# Patient Record
Sex: MALE | Race: WHITE | NOT HISPANIC OR LATINO | Employment: STUDENT | URBAN - METROPOLITAN AREA
[De-identification: names, ages, dates, MRNs, and addresses within clinical notes are randomized per-mention and may not be internally consistent; named-entity substitution may affect disease eponyms.]

---
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
## 2019-01-03 ENCOUNTER — OFFICE VISIT (OUTPATIENT)
Dept: PSYCHIATRY | Facility: CLINIC | Age: 19
End: 2019-01-03
Payer: COMMERCIAL

## 2019-01-03 DIAGNOSIS — F20.9 SCHIZOPHRENIA, UNSPECIFIED TYPE (H): Primary | ICD-10-CM

## 2019-01-03 NOTE — PROGRESS NOTES
ARASELI Clinician Contact & Progress Note  For Individual Resiliency Training (IRT)  A Part of the [facility] First Episode of Psychosis Program    NAVIGATE Enrollee: Johnny Moraes (2000)     MRN: [id number]  Date:  12/18/18  Diagnosis: Schizophrenia, unspecified (F20.9)  Clinician: ARASELI Individual Resiliency Trainer, RAINA Blanchard     1. Type of contact: (majority of time spent)  IRT Session    2. People present:   Writer  Client: Johnny Moraes    3. Total number of persons who participated in contact: 2, including writer    4. Length of Actual Contact: Start Time: 2:00; End Time: 3:00   Traveled?    No     5. Location of contact:  Psychiatry Clinic, [place]    6. Did the client complete the home practice option(s) from the previous session: Partially Completed    7. Motivational Teaching Strategies:  Connect info and skills with personal goals  Promote hope and positive expectations    8. Educational Teaching Strategies:  Relate information to client's experience  Ask questions to check comprehension  Break down information into small chunks    9. CBT Teaching Strategies:  Reinforcement and shaping (positive feedback for steps towards goals)  Relapse prevention planning (review of stressors)    10. IRT Module(s) Addressed:  Module 4 - Relapse Prevention Planning  Module 8 - Dealing with Negative Feelings  Module 9 - Coping with Symptoms    11. Techniques utilized:   [place] announced at beginning of session  Review of goal  Review of previous meeting  Present new material  Problem-solving practice  Help client choose a home practice option  Summarize progress made in current session    12. Mental Status Exam:    Alertness: alert  and oriented  Appearance: casually groomed  Behavior/Demeanor: cooperative and pleasant, with good  eye contact   Speech: normal and regular rate and rhythm  Language: intact. Preferred language identified as English.  Psychomotor: fidgety  Mood: depressed  Affect: appropriate;  "was congruent to mood; was congruent to content  Thought Process/Associations: perseverative, disorganized, loose associations, thought blocking and flight of ideas  Thought Content:  Reports delusions and preoccupations;  Denies suicidal and violent ideation  Perception:  Reports none;  Denies auditory hallucinations and visual hallucinations  Insight: fair  Judgment: fair  Cognition: does  appear grossly intact; formal cognitive testing was not done  Suicidal ideation: denies SI, denies intent,  and denies plan  Homicidal Ideation: denies    13. Assessment/Progress Note:     This writer met with Johnny for a follow-up IRT session. Throughout the session, Johnny appeared to have loose associations, thought blocking, and disorganized thoughts. He denied any auditory hallucinations and stated he has been \"thinking more than usual\" about news stories and the recommendations offered to him on Youtube. However, he stated this lasts a few minutes and then he is able to continue with his day. Johnny reported regular medication adherence. Johnny mentioned he feels as though he understands his thoughts/feelings more since being in the [facility]IG[facility] Program. Johnny would like to continue in therapy, as he feels he has an ongoing outlet for stressors. Johnny would like to gain more independence and move out within the next year. He is participating in a class through [employer]. He also plans to engage with a  later today and maintain this exercise routine. This writer praised and provided reinforcement to Johnny for taking steps towards his wellness goals. Johnny asked this writer about the benefits and research behind CBD oil. This writer discussed the lack of research into outcomes at this time. Johnny discussed the upcoming holiday season and the anticipated stressors. He recalled being \"overly anxious\" at prior family gatherings. However, he now feels more confident in his communication abilities. This writer encouraged " "Johnny to notice if any stress arises throughout the upcoming weeks. He mentioned wanting to talk to his family, go for a walk, take a deep breath if he is triggered.     14. Plan/Referrals:     This writer will continue to assist Johnny in noticing stressors and preventing relapses.     Billing for \"Interactive Complexity\"?    No      RAINA Blanchard    NAVIGATE Individual Resiliency Trainer    Attestation:    I did not see this patient directly. This patient is discussed with me in individual clinical social work supervision, and I agree with the plan as documented.     DELMER Gonzalez, LICSW, January 7, 2019        "

## 2019-01-04 ENCOUNTER — ALLIED HEALTH/NURSE VISIT (OUTPATIENT)
Dept: PSYCHIATRY | Facility: CLINIC | Age: 19
End: 2019-01-04
Payer: COMMERCIAL

## 2019-01-04 ENCOUNTER — OFFICE VISIT (OUTPATIENT)
Dept: PSYCHIATRY | Facility: CLINIC | Age: 19
End: 2019-01-04
Payer: COMMERCIAL

## 2019-01-04 ENCOUNTER — BEH TREATMENT PLAN (OUTPATIENT)
Dept: PSYCHIATRY | Facility: CLINIC | Age: 19
End: 2019-01-04

## 2019-01-04 VITALS — SYSTOLIC BLOOD PRESSURE: 142 MMHG | HEART RATE: 55 BPM | DIASTOLIC BLOOD PRESSURE: 66 MMHG

## 2019-01-04 DIAGNOSIS — F20.9 SCHIZOPHRENIA, UNSPECIFIED TYPE (H): Primary | ICD-10-CM

## 2019-01-04 NOTE — PROGRESS NOTES
NAVIGANA Clinician Contact & Progress Note  For Individual Resiliency Training (IRT)  A Part of the [facility] First Episode of Psychosis Program    NAVIGATE Enrollee: Johnny Moraes (2000)     MRN: [id number]  Date:  1/04/19  Diagnosis: Schizophrenia, unspecified type (F20.9)  Clinician: ARASELI Individual Resiliency Trainer, RAINA Blanchard     1. Type of contact: (majority of time spent)  IRT Session    2. People present:   Writer  Client: Johnny Moraes    3. Total number of persons who participated in contact: 2, including writer    4. Length of Actual Contact: Start Time: 11:15; End Time: 12:00   Traveled?    No     5. Location of contact:  Psychiatry Clinic, [place]    6. Did the client complete the home practice option(s) from the previous session: Completed    7. Motivational Teaching Strategies:  Connect info and skills with personal goals  Promote hope and positive expectations  Re-frame experiences in positive light    8. Educational Teaching Strategies:  Review of written material/education  Relate information to client's experience  Ask questions to check comprehension    9. CBT Teaching Strategies:  Reinforcement and shaping (positive feedback for steps towards goals and gains in knowledge & skills)  Cognitive restructuring (identify thoughts related to negative feelings and examine the evidence)    10. IRT Module(s) Addressed:  Module 5 - Processing the Psychotic Episode  Module 8 - Dealing with Negative Feelings    11. Techniques utilized:   [place] announced at beginning of session  Review of goal  Review of previous meeting  Present new material  Problem-solving practice  Help client choose a home practice option  Summarize progress made in current session    12. Mental Status Exam:    Alertness: alert  and oriented  Appearance: casually groomed  Behavior/Demeanor: cooperative and pleasant, with good  eye contact   Speech: increased latency of response  Language: intact. Preferred language  "identified as English.  Psychomotor: fidgety  Mood: depressed and worried  Affect: appropriate; was congruent to mood; was congruent to content  Thought Process/Associations: perseverative and response delay  Thought Content:  Reports suicidal ideation and preoccupations;  Denies violent ideation  Perception:  Reports none;  Denies auditory hallucinations and visual hallucinations  Insight: adequate  Judgment: adequate for safety  Cognition: does  appear grossly intact; formal cognitive testing was not done  Suicidal ideation: reports SI, denies intent,  and denies plan  Homicidal Ideation: denies    13. Assessment/Progress Note:     This writer met with Johnny following his injection. He was convinced his injection is a placebo or contains very small doses of medication. He denied feeling as though the medication isn't working, but said he is 3 days late in receiving it and didn't notice any symptoms. This writer provided reasoning as to why the medication would not be a placebo. He then said, \"Yes, you can't really put just water into your veins.\" He also noted his arm hurts a bit after, so \"something is happening.\" Johnny reported passive suicidal ideation while driving into the clinic today. He observed how high the bridge was and thought of hurting himself for 2 seconds. Johnny noted he typically does not travel over bridges, but did not have a plan or intent. He stated, \"I don't want to die at all.\" He had future orientation regarding the appointment and New Year's resolutions. This writer and Johnny updated his treatment plan goals. These currently include: build up physical exercise endurance, complete class at [employer], finish 12 weeks with , go on a ski trip with brother, improve nutrition (protein shakes), go on vacation with family in fall, meet friends at [employer]. Johnny denied any current positive symptoms of psychosis. He stated he had delusional thoughts about a month ago, but " "denied within the past 2 weeks. Johnny denied any negative self-talk or anxious thoughts. Johnny expressed interest in continuing to process his episode. This writer and Johnny began processing, starting with his sophomore year of high school and prodrome symptoms. 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     14. Plan/Referrals:     This writer will continue to assist Johnny in processing his episode, using a narrative timeline.     Billing for \"Interactive Complexity\"?    No      RAINA Blanchard    NAVIGANA Individual Resiliency Trainer    Attestation:    I did not see this patient directly. This patient is discussed with me in individual clinical social work supervision, and I agree with the plan as documented.     DELMER Gonzalez, [facility]. [facility] Hospital[facility], January 31, 2019      "

## 2019-01-07 ASSESSMENT — ANXIETY QUESTIONNAIRES
1. FEELING NERVOUS, ANXIOUS, OR ON EDGE: NOT AT ALL
2. NOT BEING ABLE TO STOP OR CONTROL WORRYING: SEVERAL DAYS
3. WORRYING TOO MUCH ABOUT DIFFERENT THINGS: NOT AT ALL
5. BEING SO RESTLESS THAT IT IS HARD TO SIT STILL: NOT AT ALL
6. BECOMING EASILY ANNOYED OR IRRITABLE: SEVERAL DAYS
7. FEELING AFRAID AS IF SOMETHING AWFUL MIGHT HAPPEN: SEVERAL DAYS
GAD7 TOTAL SCORE: 3

## 2019-01-07 ASSESSMENT — PATIENT HEALTH QUESTIONNAIRE - PHQ9
5. POOR APPETITE OR OVEREATING: NOT AT ALL
SUM OF ALL RESPONSES TO PHQ QUESTIONS 1-9: 4

## 2019-01-08 NOTE — PROGRESS NOTES
NAVIGATE SEE Progress Note   For Supported Employment & Education    NAVIGATE Enrollee: Johnny Moraes (2000)     MRN: [id number]  Date:  1/3/19  Clinician: [facility] Supported Employment & , Anais Quiroz    1. Client Status Update:   Johnny Moraes is interested in education (Client enrolled in class or school (e.g. GED course, certificate program, [employer] or other educational programs)) and employment (Client developed employement goals)    2. People present:   SEE/Writer  Client: Johnny Moraes    3. Total number of persons who participated in contact: (do not count yourself/SEE) 1    4. Length of Actual Contact: 60 minutes   Traveled? Yes  Total Travel Time: 45 minutes    5. Location of contact:  Client's Home    6. Brief description of session, contact, or client status (include: strategies, interventions, client reaction to contact, next steps, etc)    Writer and Johnny met at his home for a follow up SEE session. Johnny reports he has been doing well. He had a low key holiday and signed up for a  at [employer]. Johnny also signed up for a hybrid online/in person course at [employer] that starts 1/14/19. Johnny would like to start working in Feb/March after he has been in class for a few weeks.   Johnny needed to complete an online orientation to [employer] so we sat and read outloud each section on [employer], Student Services, Preparing for College, Tuition and Payment as well as checking grades, how to sign up for the Access Center and peer tutoring. Johnny felt he had adequate information on how to start school. This writer and Johnny will work on Academic and study skills as well as working on exploring jobs near his home.     7. Completion of mutually agreed upon client task from previous meeting:  Completed    8. Orientation and Treatment Planning:  Pursuing current SEE goals    9. Assessment:  Assessing client's need for follow-along supports    10.  Placement:  Education (Assisting client with completing forms or applications)    11. Follow Along Supports: (for clients who are working or attending school)   Education (Assisting with requesting accommodations)    12. Mutually agreed upon client task for next meeting:     Call access center    13. Next Meeting Scheduled for: 1/17 and phone check in the 14th before class    Anais [facility]ILL[employer] Supported Employment &

## 2019-01-09 ASSESSMENT — ANXIETY QUESTIONNAIRES: GAD7 TOTAL SCORE: 3

## 2019-01-17 ENCOUNTER — OFFICE VISIT (OUTPATIENT)
Dept: PSYCHIATRY | Facility: CLINIC | Age: 19
End: 2019-01-17
Payer: COMMERCIAL

## 2019-01-17 DIAGNOSIS — F20.9 SCHIZOPHRENIA, UNSPECIFIED TYPE (H): Primary | ICD-10-CM

## 2019-01-17 NOTE — PROGRESS NOTES
NAVIGATE SEE Progress Note   For Supported Employment & Education    NAVIGATE Enrollee: Johnny Moraes (2000)     MRN: [id number]  Date:  1.17.19  Clinician: [facility] Supported Employment & , Anais Quiroz    1. Client Status Update:   Johnny Moraes is interested in education (Client enrolled in class or school (e.g. GED course, certificate program, [employer] college or other educational programs))    2. People present:   SEE/Writer  Client: Johnny Moraes      3. Total number of persons who participated in contact: (do not count yourself/SEE) 1    4. Length of Actual Contact: 40 minutes   Traveled? Yes  Total Travel Time: 40 minutes    5. Location of contact:  Client's Home    6. Brief description of session, contact, or client status (include: strategies, interventions, client reaction to contact, next steps, etc)    Johnny and this writer met at his home for a follow up see session. Johnny reports things are going well and has been meeting with a  and working out 4x a week. 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 Johnny and this writer reviewed his syllabi and put due dates of quizzes and exams in his calendar. Johnny feels confident that he will do well this semester with the help of his mom and step dad. Johnny and this writer discussed having only check ins as needed with the intention of phasing out of navigate.    7. Completion of mutually agreed upon client task from previous meeting:  Completed    8. Orientation and Treatment Planning:  Pursuing current SEE goals    9. Assessment:  Assessing client's need for follow-along supports    10. Placement:  Not Applicable    11. Follow Along Supports: (for clients who are working or attending school)   Education (Assisting with development and use of natural support for school)    12. Mutually agreed upon client task for next meeting:      Add all dates in calendar for school, finish assignments    13. Next Meeting Scheduled for: mame [employer] Supported Employment &

## 2019-01-17 NOTE — Clinical Note
KYLER Turner seemed to be doing really well. He was able to communicate clearer than ever and says he is feeling confident that this semester will be fine just doing check ins with me over the phone. He'd like to start phasing out of Navigate.

## 2019-01-18 ENCOUNTER — ALLIED HEALTH/NURSE VISIT (OUTPATIENT)
Dept: PSYCHIATRY | Facility: CLINIC | Age: 19
End: 2019-01-18
Payer: COMMERCIAL

## 2019-01-18 ENCOUNTER — OFFICE VISIT (OUTPATIENT)
Dept: PSYCHIATRY | Facility: CLINIC | Age: 19
End: 2019-01-18
Payer: COMMERCIAL

## 2019-01-18 VITALS — SYSTOLIC BLOOD PRESSURE: 131 MMHG | HEART RATE: 60 BPM | DIASTOLIC BLOOD PRESSURE: 40 MMHG

## 2019-01-18 DIAGNOSIS — F20.9 SCHIZOPHRENIA, UNSPECIFIED TYPE (H): Primary | ICD-10-CM

## 2019-01-18 NOTE — NURSING NOTE
Johnny Moraes comes into clinic today at the request of ELMO Ram, PMHNP-BC Ordering Provider for Med Injection only Risperdal Consta.    Medication dbouel checked by: Jet Posey RNCC  Prior to administration pt identified by name and : yes    Appearance: dressed casually    Mood: good  Affect: flat   Eye contact: fair   Side effects: denies   Level of cooperation: cooperative  Education: none needed   Next appt: two weeks         This service provided today was under the supervising provider of the day Danyell Junior MD, who was available if needed.    LAILA COLLAZO

## 2019-01-21 ASSESSMENT — ANXIETY QUESTIONNAIRES
5. BEING SO RESTLESS THAT IT IS HARD TO SIT STILL: SEVERAL DAYS
1. FEELING NERVOUS, ANXIOUS, OR ON EDGE: NOT AT ALL
6. BECOMING EASILY ANNOYED OR IRRITABLE: NOT AT ALL
3. WORRYING TOO MUCH ABOUT DIFFERENT THINGS: NOT AT ALL
2. NOT BEING ABLE TO STOP OR CONTROL WORRYING: NOT AT ALL
7. FEELING AFRAID AS IF SOMETHING AWFUL MIGHT HAPPEN: NOT AT ALL
GAD7 TOTAL SCORE: 1

## 2019-01-21 ASSESSMENT — PATIENT HEALTH QUESTIONNAIRE - PHQ9
5. POOR APPETITE OR OVEREATING: NOT AT ALL
SUM OF ALL RESPONSES TO PHQ QUESTIONS 1-9: 1

## 2019-01-21 NOTE — PROGRESS NOTES
ARASELI Clinician Contact & Progress Note  For Individual Resiliency Training (IRT)  A Part of the [facility] First Episode of Psychosis Program    NAVIGATE Enrollee: Johnny Moraes (2000)     MRN: [id number]  Date:  1/18/19  Diagnosis: Schizophrenia, unspecified (F20.9)  Clinician: ARASELI Individual Resiliency Trainer, RAINA Blanchard     1. Type of contact: (majority of time spent)  IRT Session    2. People present:   Writer  Client: Johnny Moraes    3. Total number of persons who participated in contact: 2, including writer    4. Length of Actual Contact: Start Time: 9:00; End Time: 10:00   Traveled?    No     5. Location of contact:  Psychiatry Clinic, [place]    6. Did the client complete the home practice option(s) from the previous session: Completed    7. Motivational Teaching Strategies:  Connect info and skills with personal goals  Promote hope and positive expectations  Explore pros and cons of change    8. Educational Teaching Strategies:  Review of written material/education  Relate information to client's experience  Ask questions to check comprehension  Break down information into small chunks    9. CBT Teaching Strategies:  Reinforcement and shaping (positive feedback for steps towards goals and gains in knowledge & skills)    10. IRT Module(s) Addressed:  Module 3 - Education about Psychosis  Module 7 - Building a Bridging to Your Goals    11. Techniques utilized:   [place] announced at beginning of session  Review of homework  Review of goal  Problem-solving practice  Help client choose a home practice option  Summarize progress made in current session    12. Mental Status Exam:    Alertness: oriented, drowsy and sleepy  Appearance: casually groomed  Behavior/Demeanor: cooperative, pleasant and calm, with good  eye contact   Speech: normal and regular rate and rhythm  Language: intact. Preferred language identified as English.  Psychomotor: normal or unremarkable  Mood: depressed  Affect:  "appropriate; was congruent to mood; was congruent to content  Thought Process/Associations: unremarkable  Thought Content:  Reports none;  Denies suicidal and violent ideation and delusions  Perception:  Reports none;  Denies auditory hallucinations and visual hallucinations  Insight: good  Judgment: good  Cognition: does  appear grossly intact; formal cognitive testing was not done  Suicidal ideation: denies SI, denies intent,  and denies plan  Homicidal Ideation: denies    13. Assessment/Progress Note:     This writer met with Johnny for a follow-up IRT session. He noted he has not had any positive psychosis symptoms within the past 2 weeks. He has been regularly exercising and has obtained a . He expressed difficulties with falling asleep throughout the week. This writer discussed sleep hygiene tips, including reducing screen time before bed and a routine sleep schedule. Johnny expressed understanding and agreed to try this. He has started college courses, with 6 total credits. He feels this is a manageable caseload and denied an increase in stress. He expressed pride when discussing introducing himself on the first day of class. Johnny began processing through the progress he's made within the past 2 years, since joining [employer]. He said he's gained independence, decreased anxiety about others' thoughts, completed exercise goals, began school again, and is reaching out to others when help is needed. This writer praised Johnny for all of his continued success. Johnny would like to have a family treatment planning meeting to further discuss discharge. He would like to transition to community care for medications, but feels as though he is able to maintain wellness independently otherwise.      14. Plan/Referrals:     This writer will have scheduling call to set up a family and NAVIGATE team discharge meeting.    This writer will update the team.    Billing for \"Interactive Complexity\"?    No      Mary " RAINA Johnson    [facility] Individual Resiliency Trainer    Attestation:    I did not see this patient directly. This patient is discussed with me in individual clinical social work supervision, and I agree with the plan as documented.     DELMER Gonzalez, LICSW, February 11, 2019

## 2019-01-22 ASSESSMENT — ANXIETY QUESTIONNAIRES: GAD7 TOTAL SCORE: 1

## 2019-01-31 ENCOUNTER — TELEPHONE (OUTPATIENT)
Dept: PSYCHIATRY | Facility: CLINIC | Age: 19
End: 2019-01-31

## 2019-01-31 NOTE — TELEPHONE ENCOUNTER
-Writer called patient to discuss injection, as injection copayment is over $900.  Received voicemail.  Left message asking for a return call.

## 2019-02-01 NOTE — TELEPHONE ENCOUNTER
-Writer also attempted to call patient's mother and received voicemail.  Left message asking for a return call regarding appointment and copay for injection.

## 2019-02-01 NOTE — TELEPHONE ENCOUNTER
-Was able to speak with Yeimi, she is going to call and arrange payment for injection.  Appointment rescheduled for 2/4/19.

## 2019-02-04 ENCOUNTER — ALLIED HEALTH/NURSE VISIT (OUTPATIENT)
Dept: PSYCHIATRY | Facility: CLINIC | Age: 19
End: 2019-02-04
Payer: COMMERCIAL

## 2019-02-04 VITALS — DIASTOLIC BLOOD PRESSURE: 55 MMHG | SYSTOLIC BLOOD PRESSURE: 121 MMHG | HEART RATE: 64 BPM

## 2019-02-04 DIAGNOSIS — F20.9 SCHIZOPHRENIA, UNSPECIFIED TYPE (H): Primary | ICD-10-CM

## 2019-02-04 NOTE — NURSING NOTE
Johnny Moraes comes into clinic today at the request of ELMO Ram, PMP-BC Ordering Provider for Med Injection only Risperdal Consta.    Medication double checked by: Cathi Tse RNCC  Prior to administration pt identified by name and : yes     Appearance: dressed casually    Mood: good  Affect: flat  Eye contact: good  Side effects: denies   Level of cooperation: cooperative  Education: none needed   Next appt:  2 weeks       Was there drug waste? No   Multi-dose vial: No      This service provided today was under the supervising provider of the day Danyell Junior MD, who was available if needed.    LAILA COLLAZO

## 2019-02-05 NOTE — PROGRESS NOTES
[facility]IG[facility] Program Treatment Plan Summary  A Part of the [facility] First Episode of Psychosis Program     NAVIGATE Enrollee: Johnny Moraes  /Age:  2000 (19 year old)  MRN: [id number]    Date of Initial Service: 3/13/17  Date of INTIAL Treatment Plan: 17  Last Review/Update Date:  10/19/18  90-Day Review Date:  19    The following represents REVIEWED treatment plan.    1. DSM-V Diagnosis (include numeric code)  Schizophrenia, 295.90 (F20.9)    2. Current symptoms and circumstances that substantiate the diagnosis    Johnny has a history of psychosis (delusions, visual hallucinations, disorganized speech and negative symptoms (diminished emotional expression, anhedonia and apathy)), depression (low mood nearly every day, anhedonia most of the time, appetite change (decrease), weight change (decrease), difficulties with sleep, low energy and suicidal ideation with plan, with intent), SI and low self-esteem. He presents with current symptoms of psychosis (delusions).     3. How symptoms and/or behaviors are affecting level of function    Johnny s systems are impacting functioning with respect to social relationships, employment and academics.    4. Risk Assessment:  Suicide:  Assessed Level of Immediate Risk: Low  Ideation: No  Plan:  No  Means: No  Intent: No    Homicide/Violence:  Assessed Level of Immediate Risk: None  Ideation: No  Plan: No  Means: No  Intent: No    5. Medications    Current Outpatient Medications   Medication     FLUoxetine (PROZAC) 40 MG capsule     hydrOXYzine (VISTARIL) 25 MG capsule     loratadine (CLARITIN) 10 MG tablet     multivitamin, therapeutic with minerals (MULTI-VITAMIN) TABS tablet     risperiDONE (RISPERDAL) 2 MG tablet     risperiDONE microspheres (RISPERDAL CONSTA) 25 MG injection     Current Facility-Administered Medications   Medication     risperiDONE microspheres (risperDAL CONSTA) injection 25 mg       6. Strengths     Medication adherence  Supportive friends,  family, recovery environment  Capacity to love and be loved    Curiosity    Gratitude & Thankfulness  Honest, Authentic, Genuine    Humor & Playfulness   Industry, diligence, & perseverance    Judgment, critical thinking, & open-mindedness    Kind & Generous    Modesty & Humility    Self-control & Self-regulation    Teamwork & Loyalty      7. Barriers & Suicide Risk Factors     Depression and/or Hopelessness  Male    8. Treatment Domains and Goals    Domain 1: Illness Management & Recovery  Identify and engage possible areas of improvement related to medication optimization, psychosis (delusions and negative symptoms (diminished emotional expression and anhedonia)) and depression (difficulties with sleep and low energy) and ability to management illness.     Measurable Objectives Interventions Target Dates & Discharge Criteria   Medication Objectives    -Support system assists with overcoming barriers to taking medications   Medication Management  -Prescribe and monitor medications  -Monitor and treat side effects  -Psychoeducation  -Behavioral activation  -Initial and routine lab work    IRT/Psychotherapy  -Psychoeducation  -Motivation interviewing  -CBT  -Behavioral activation  -Mindfulness  -Family involvement during portions of sessions    Family Therapy  -Psychoeducation  -Motivational interviewing  -Behavioral family therapy  -CBT  -Behavioral activation    Case Management  -NA or None   Target date:   3 months from 1/04/19    Discharge criteria:  Marked and sustained symptom improvement     Gains Made:  -Paricipate in a medication evaluation   -Take medications as prescribed and have reduced frequency and severity of symptoms  -Learn and implement strategies for overcoming barriers to taking medication  -Support system assists with overcoming barriers to taking medications     Individual s Objectives    -Learn strategies to build positive emotions and facilitate resiliency   -Identify primary styles of  thinking, and demonstrate understanding of and use cognitive restructuring to successfully deal with negative feelings  -Identify persistent symptoms that interfere with activities and/or enjoyment and successfully implement two coping strategies to reduce symptoms severity   Medication Management  -Prescribe and monitor medications  -Monitor and treat side effects  -Psychoeducation    IRT/Psychotherapy  -Psychoeducation  -Motivation interviewing  -CBT  -Behavioral activation  -Mindfulness    Family Therapy  -Psychoeducation  -Motivational interviewing  -Behavioral family therapy  -CBT  -Behavioral activation    Case Management  -NA or None   Target date:   3 months from 1/04/19    Discharge criteria:  Marked and sustained symptom improvement     Johnny demonstrates understanding of mental illness     Johnny successfully implements strategies to cope with stressors and/or symptoms to mitigate risk for increase in symptom severity or relapse    Gains Made:  -Complete a safety plan with therapist and share with support system  -Define what recovery means to self  -Identify psychosocial areas of need  -Identify top 5 strengths and use those strengths when working toward goal achievement; simultaneously choose one area for improvement and identify two actionable steps toward improvement  -Create a goal plan consisting of one long-term goal, three short-term goals, and actionable steps toward short-term goal achievement  -Demonstrate understanding of psychosis (delusions) in the context of self with respect to symptoms, causes, course, medications and the impact of stress  -Learn at least 2 coping strategies to successfully target current symptoms  -Demonstrate understanding for how substance use impacts symptoms, identify stage of change, and experiment with reduced use or abstinence from all illicit substances   -Develop and implement a relapse prevention plan including identification of warning signs, triggers, coping  mechanisms, and how other persons can be supportive if symptoms increase or reemerge   -Process the psychotic episode by demonstrating understanding of how the episode impacted self, identifying positive coping strategies and resiliency used during that time, challenging self-stigmatizing beliefs, and developing a positive attitude towards facing future life challenges  -Identify primary styles of thinking, and demonstrate understanding of and use cognitive restructuring to successfully deal with negative feelings  -Identify persistent symptoms that interfere with activities and/or enjoyment and successfully implement two coping strategies to reduce symptoms severity     Support System Objectives    -Supports agree to provide supervision and monitor suicidal potential   -Identify psychosocial areas of need   Medication Management  -Prescribe and monitor medications  -Monitor and treat side effects  -Psychoeducation    IRT/Psychotherapy  -Psychoeducation  -Motivation interviewing  -CBT  -Behavioral activation    Family Therapy  -Psychoeducation  -Motivational interviewing  -Behavioral family therapy  -CBT  -Behavioral activation    Case Management  -NA or None   Target date:   3 months from 1/04/19    Discharge criteria:  Support system demonstrates understanding of mental illness     Support system successfully implements strategies to assist Johnny cope with stressors and/or symptoms to mitigate risk for increase in symptom severity or relapse     Gains Made:  -Supports agree to provide supervision and monitor suicidal potential   -Supports, including family members, verbally reinforce the client's active attempts to build self-esteem and rapport   -Supports verbalize increased understanding of an knowledge about the client's illness and treatment   -Identify psychosocial areas of need  -Verbalize understanding of the client's long-term and short-term goals  -Demonstrate understanding of psychosis (delusions) in the  context of the client with respect to symptoms, causes, course, medications and the impact of stress  -Learn the client's signs of stress and possible coping skills  -Learn skills that strengthen and support the client's positive behavior change  -Develop and implement a relapse prevention plan including identification of warning signs, triggers, coping mechanisms, and how other persons can be supportive if symptoms increase or reemerge   -Learn and implement communication skills to enhance communication and respect among family members  -Learn and implement problem-solving and/or conflict resolution skills to manage familial, personal and interpersonal problems constructively       Domain 2: Health & Basic Living Needs  Identify and engage possible areas of improvement related to basic needs being met and maintaining or improving overall health and well-being     Measurable Objectives Interventions Discharge Criteria   -Learn and implement at least 2 skills to promote health sleep  -Establish and adhere to a plan to increase physical exercise   Medication Management  -Prescribe and monitor medications  -Monitor and treat side effects  -Psychoeducation    IRT/Psychotherapy  -Psychoeducation  -Motivation interviewing  -CBT  -Behavioral activation    Family Therapy  -Psychoeducation  -Motivational interviewing  -Behavioral family therapy  -CBT  -Behavioral activation    Supported Education & Employment  -Motivational interviewing  -Individualized placement and support     Case Management  -NA or None   Target date:   3 months from 1/04/19    Discharge criteria:  Johnny, his supports and treatment team report no unmet health and basic living needs    Gains Made:  -Learn and implement at least healthy nutritional practices  -Learn and implement at least 2 skills to promote health sleep  -Establish and adhere to a plan to increase physical exercise  -Independently arrange transportation to/from appointments   -Identify  areas of improvement for ADLs and IADLs and implement at least two skills with improved outcomes       Domain 3: Family & Other Supports  Identify and engage possible areas of improvement related to engaging family, friends and other supports     Measurable Objectives Interventions Discharge Criteria   -Participate in group therapy   -Improve the quality of relationships by developing skills to better understand other people, communicate more effectively, manage disclosure, and understand social cues   Medication Management  -Prescribe and monitor medications  -Monitor and treat side effects  -Psychoeducation  -Behavioral activation    IRT/Psychotherapy  -Psychoeducation  -Motivation interviewing  -CBT  -Behavioral activation    Family Therapy  -Psychoeducation  -Motivational interviewing  -Behavioral family therapy  -CBT  -Behavioral activation    Supported Education & Employment  -Motivational interviewing  -Individualized placement and support   -Behavioral Activation    Case Management  -NA or None   Target date:   3 months from 1/04/19    Discharge criteria:  Johnny and his support system report feeling equipped with the necessary skills to communicate and problem solve during times of disagreement    Conflict with supports and peers are resolved constructively and consistently over time; 6 months    Gains Made:  -Identify support system  -Invite support system to be involved in treatment  -Participate in family therapy  -Participate in group therapy   -Improve the quality of relationships by developing skills to better understand other people, communicate more effectively, manage disclosure, and understand social cues  -Increase communication with the parents, resulting in feeling attended to and understood  -Increase the frequency of positive interactions with parents  -Supports teach and reinforce healthy social skills and attitudes   -Learn and implement problem-solving and/or conflict resolution skills to  manage personal and interpersonal problems constructively  -Identify and implement two approaches to how strengths and interests can be used to initiate social contacts and develop peer friendships  -Learn and implement calming and coping strategies to manage anxiety symptoms and focus attention usefully during moments of social anxiety    -Strengthen the social support network with friends by initiating social contact and participating in social activities with peers   -Increase participation in interpersonal or peer group activities        Domain 4: Academic and Employment  Identify and engage possible areas of improvement relates to education and employment     Measurable Objectives Interventions Discharge Criteria   -Explore areas of interest for continued educational opportunities   -Explore areas of interest for employment purposes   Medication Management  -Prescribe and monitor medications  -Monitor and treat side effects  -Psychoeducation  -Behavioral activation    IRT/Psychotherapy  -Psychoeducation  -Motivation interviewing  -CBT  -Behavioral activation  -Mindfulness    Family Therapy  -Psychoeducation  -Motivational interviewing  -Behavioral family therapy  -CBT  -Behavioral activation    Supported Education & Employment  -Motivational interviewing  -Individualized placement and support   -Behavioral Activation    Case Management  -NA or None   Target date:   3 months from 1/04/19    Discharge criteria:  Work and school goals are achieved and maintained without follow along [facility] Supported Education and Employment supports for 6 months    Gains Made:  -Explore areas of interest for continued educational opportunities   -Explore areas of interest for employment purposes  -Utilize accommodations, effective study and test-taking skills on a regular basis to improve academic performance  -Obtain/maintain gainful employment   -Family members provide praise, encouragement for the client's attempts and  successes at school/work       9. Frequency of sessions and expected duration of treatment:   1-4x per month Medication Management with Prescriber ongoing  6 months of weekly IRT/Individual Psychotherapy followed by 12-18 months of biweekly or monthly IRT  2-4x per month Supported Education and Employment Services for 6 months  2-4x per month Family Education and Support Services for 6 months    10. Participants in therapy plan:   Johnny Moraes  Support System: Family and friends    NA[facility]IGATE Team:   -IRT Clinician: DELMER [facility]  -SEE: MARSHA Valdez  -[facility]    See scanned document for Acknowledgement of Current Treatment Plan    Regulatory Guidelines for Updating Treatment Plan  [place] Medical Assistance: Reviewed & signed at least every 90 days  Medicare:  Update per policy

## 2019-02-18 ENCOUNTER — TELEPHONE (OUTPATIENT)
Dept: PSYCHIATRY | Facility: CLINIC | Age: 19
End: 2019-02-18

## 2019-02-18 ENCOUNTER — ALLIED HEALTH/NURSE VISIT (OUTPATIENT)
Dept: PSYCHIATRY | Facility: CLINIC | Age: 19
End: 2019-02-18
Payer: COMMERCIAL

## 2019-02-18 ENCOUNTER — OFFICE VISIT (OUTPATIENT)
Dept: PSYCHIATRY | Facility: CLINIC | Age: 19
End: 2019-02-18
Payer: COMMERCIAL

## 2019-02-18 VITALS
DIASTOLIC BLOOD PRESSURE: 82 MMHG | SYSTOLIC BLOOD PRESSURE: 135 MMHG | BODY MASS INDEX: 28.74 KG/M2 | TEMPERATURE: 98.1 F | HEART RATE: 91 BPM | WEIGHT: 189 LBS

## 2019-02-18 DIAGNOSIS — F20.9 SCHIZOPHRENIA, UNSPECIFIED TYPE (H): Primary | ICD-10-CM

## 2019-02-18 ASSESSMENT — PAIN SCALES - GENERAL: PAINLEVEL: NO PAIN (0)

## 2019-02-18 NOTE — PROGRESS NOTES
"  [facility] Clinic Progress Note                                                                  Johnny Moraes is a 18 year old male with a history of two prior psychiatric hospitalizations and no suicide attempts who presents to the clinic today for a follow up visit. He is an established patient with the [facility] clinic. His most recent hospitalization was approximately in May 2017 when he was started on Ativan for symptoms of catatonia.  Therapist: Mary DOWELL, LGSW, ARASELI IRT  PCP: Jet Salas  Other Providers: N/A  Pertinent Background:  returns for continued care.  Psych critical item history includes psychosis [sxs include paranoia], psych hosp (<3) and SUBSTANCE USE: cannabis.       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 Doing well.       Social/ Family History                                  [per patient report]                                   1ea,1ea   Reviewed 9/5/2018  FINANCIAL SUPPORT- parents and working     CHILDREN- none       LIVING SITUATION- parents (mother)     SOCIAL/ SPIRITUAL SUPPORT- family       FEELS SAFE AT HOME- Yes  TRAUMA HISTORY (self-report)- None  EARLY HISTORY/ EDUCATION- High school    Medical / Surgical History                                                                                                                  Patient Active Problem List   Diagnosis     Psychosis (H)     Schizophrenia, unspecified (H)       Past Surgical History:   Procedure Laterality Date     APPENDECTOMY        Medical Review of Systems                                                                                                        2,10   A comprehensive review of systems was performed and is negative other than noted in the HPI.  Allergy                                Seasonal allergies  Current Medications            "                                                                                            Current Outpatient Medications   Medication Sig Dispense Refill     FLUoxetine (PROZAC) 40 MG capsule Take 40mg by mouth once daily. Take with 20mg by mouth once daily to equal total daily dose of 60mg per day. 30 capsule 1     hydrOXYzine (VISTARIL) 25 MG capsule Take 1-3 tabs every 4 hours as needed for anxiety up to 3 times a day 120 capsule 0     loratadine (CLARITIN) 10 MG tablet Take 10 mg by mouth       multivitamin, therapeutic with minerals (MULTI-VITAMIN) TABS tablet Take 1 tablet by mouth daily       risperiDONE (RISPERDAL) 2 MG tablet Take 1 tablet (2 mg) by mouth At Bedtime 30 tablet 1     risperiDONE microspheres (RISPERDAL CONSTA) 25 MG injection Inject 2 mLs (25 mg) into the muscle every 14 days 2 each 11     Vitals                                                                                                                            /82 (BP Location: Right arm, Patient Position: Chair, Cuff Size: Adult Regular)   Pulse 91   Temp 98.1  F (36.7  C)   Wt 85.7 kg (189 lb)   BMI 28.74 kg/m     Mental Status Exam                                                                                        9, 14 cog gs     Appearance:  awake, alert  Attitude:  cooperative   Eye Contact:  good  Gait and Station: Normal  Psychomotor Behavior:  no evidence of tardive dyskinesia, dystonia, or tics  Oriented to:  time, person, and place  Attention Span and Concentration:  Normal  Speech: rate, rhythm, content unremarkable, spontaneous with some latency observed  Mood:  good  Affect:  appropriate and in normal range  Thought Process:  evidence of thought blocking present, somewhat circumstantial   Thought Content:  no evidence of suicidal ideation or homicidal ideation, no auditory hallucinations present and no visual hallucinations present  Recent and Remote Memory:  intact Not formally assessed. No amnesia.  Fund  of Knowledge: appropriate  Insight:  good  Judgment: limited  Impulse Control:  limited    Labs and Data                                                                                                                 Rating Scales:  AIMS, Compass     PHQ-9 SCORE 12/11/2018 1/7/2019 1/21/2019   PHQ-9 Total Score 7 4 1       Diagnosis and Assessment                                                                                      +,++     300.02 (F41.1) Generalized Anxiety Disorder    Unspecified Schizophrenia Spectrum and Other Psychotic Disorder    Problem Focused Plan                                                                                              +,++     No changes in plan, continue to take medications as prescribed.     1) Medications:  Continue fluoxetine to 40mg po daily and risperidone 2mg-- would like to discuss a possible conservative taper in the future  Current Outpatient Medications   Medication Sig     FLUoxetine (PROZAC) 40 MG capsule Take 40mg by mouth once daily. Take with 20mg by mouth once daily to equal total daily dose of 60mg per day.     hydrOXYzine (VISTARIL) 25 MG capsule Take 1-3 tabs every 4 hours as needed for anxiety up to 3 times a day     loratadine (CLARITIN) 10 MG tablet Take 10 mg by mouth     multivitamin, therapeutic with minerals (MULTI-VITAMIN) TABS tablet Take 1 tablet by mouth daily     risperiDONE (RISPERDAL) 2 MG tablet Take 1 tablet (2 mg) by mouth At Bedtime     risperiDONE microspheres (RISPERDAL CONSTA) 25 MG injection Inject 2 mLs (25 mg) into the muscle every 14 days     Current Facility-Administered Medications   Medication     risperiDONE microspheres (risperDAL CONSTA) injection 25 mg     2) Therapy: Continue with [facility], would like to evaluate his ongoing participation with the program    RTC: 1 month    CRISIS NUMBERS:   Provided routinely in AVS.    Treatment Risk Statement:  The patient understands the risks, benefits, adverse effects and  alternatives. Agrees to treatment with the capacity to do so. No medical contraindications to treatment. Agrees to call clinic for any problems. The patient understands to call 911 or come to the nearest ED if life threatening or urgent symptoms present.    PROVIDER:  Heriberto Bray MD, PhD    Physician Attestation   I, Danyell Junior, saw this patient and agree with the findings and plan of care as documented in the note.        Danyell Junior MD

## 2019-02-18 NOTE — NURSING NOTE
Johnny Moraes comes into clinic today at the request of Emilio Bray MD Ordering Provider for Med Injection only Risperdal Consta .    Medication double checked by: Cathi Tse RNCC  Prior to administration pt identified by name and : yes    Appearance: dressed casually    Mood: good  Affect: congruent   Eye contact: good  Side effects: denies   Level of cooperation: cooperative   Education: none needed  Next appt: 2 weeks         This service provided today was under the supervising provider of the day Danyell Junior MD, who was available if needed.    LAILA COLLAZO

## 2019-02-19 ASSESSMENT — ANXIETY QUESTIONNAIRES
3. WORRYING TOO MUCH ABOUT DIFFERENT THINGS: SEVERAL DAYS
2. NOT BEING ABLE TO STOP OR CONTROL WORRYING: NOT AT ALL
7. FEELING AFRAID AS IF SOMETHING AWFUL MIGHT HAPPEN: NOT AT ALL
GAD7 TOTAL SCORE: 2
6. BECOMING EASILY ANNOYED OR IRRITABLE: NOT AT ALL
5. BEING SO RESTLESS THAT IT IS HARD TO SIT STILL: NOT AT ALL
1. FEELING NERVOUS, ANXIOUS, OR ON EDGE: NOT AT ALL

## 2019-02-19 ASSESSMENT — PATIENT HEALTH QUESTIONNAIRE - PHQ9
5. POOR APPETITE OR OVEREATING: SEVERAL DAYS
SUM OF ALL RESPONSES TO PHQ QUESTIONS 1-9: 4

## 2019-02-20 ASSESSMENT — ANXIETY QUESTIONNAIRES: GAD7 TOTAL SCORE: 2

## 2019-03-04 ENCOUNTER — RECORDS - HEALTHEAST (OUTPATIENT)
Dept: LAB | Facility: CLINIC | Age: 19
End: 2019-03-04

## 2019-03-04 ENCOUNTER — TRANSFERRED RECORDS (OUTPATIENT)
Dept: HEALTH INFORMATION MANAGEMENT | Facility: CLINIC | Age: 19
End: 2019-03-04

## 2019-03-04 LAB
ALBUMIN SERPL-MCNC: 4.1 G/DL (ref 3.5–5)
ALP SERPL-CCNC: 87 U/L (ref 45–120)
ALT SERPL W P-5'-P-CCNC: 13 U/L (ref 0–45)
ANION GAP SERPL CALCULATED.3IONS-SCNC: 7 MMOL/L (ref 5–18)
AST SERPL W P-5'-P-CCNC: 19 U/L (ref 0–40)
BILIRUB SERPL-MCNC: 0.4 MG/DL (ref 0–1)
BUN SERPL-MCNC: 13 MG/DL (ref 8–22)
CALCIUM SERPL-MCNC: 9.9 MG/DL (ref 8.5–10.5)
CHLORIDE BLD-SCNC: 104 MMOL/L (ref 98–107)
CHOLEST SERPL-MCNC: 147 MG/DL
CO2 SERPL-SCNC: 28 MMOL/L (ref 22–31)
CREAT SERPL-MCNC: 0.76 MG/DL (ref 0.7–1.3)
FASTING STATUS PATIENT QL REPORTED: ABNORMAL
GFR SERPL CREATININE-BSD FRML MDRD: >60 ML/MIN/1.73M2
GLUCOSE BLD-MCNC: 87 MG/DL (ref 70–125)
HDLC SERPL-MCNC: 37 MG/DL
LDLC SERPL CALC-MCNC: 85 MG/DL
POTASSIUM BLD-SCNC: 4.6 MMOL/L (ref 3.5–5)
PROT SERPL-MCNC: 7.3 G/DL (ref 6–8)
SODIUM SERPL-SCNC: 139 MMOL/L (ref 136–145)
TRIGL SERPL-MCNC: 123 MG/DL
TSH SERPL DL<=0.005 MIU/L-ACNC: 3.25 UIU/ML (ref 0.3–5)

## 2019-03-05 ENCOUNTER — OFFICE VISIT (OUTPATIENT)
Dept: PSYCHIATRY | Facility: CLINIC | Age: 19
End: 2019-03-05
Payer: COMMERCIAL

## 2019-03-05 ENCOUNTER — ALLIED HEALTH/NURSE VISIT (OUTPATIENT)
Dept: PSYCHIATRY | Facility: CLINIC | Age: 19
End: 2019-03-05
Payer: COMMERCIAL

## 2019-03-05 ENCOUNTER — TELEPHONE (OUTPATIENT)
Dept: PSYCHIATRY | Facility: CLINIC | Age: 19
End: 2019-03-05

## 2019-03-05 VITALS — HEART RATE: 72 BPM | SYSTOLIC BLOOD PRESSURE: 147 MMHG | DIASTOLIC BLOOD PRESSURE: 70 MMHG

## 2019-03-05 DIAGNOSIS — F20.9 SCHIZOPHRENIA, UNSPECIFIED TYPE (H): Primary | ICD-10-CM

## 2019-03-05 NOTE — NURSING NOTE
Johnny Moraes comes into clinic today at the request of Emilio Bray MD Ordering Provider for Med Injection only Risperdal Consta.    Medication checked by: Jet Posey, DESIREECC  Prior to administration pt identified by name and : yes    Appearance: dressed casually    Mood: good  Affect: congreutn  Eye contact:  good  Side effects: denies   Level of cooperation: cooperative  Education: none needed   Next appt: 2 weeks       Medication provided by Pharmacy      This service provided today was under the supervising provider of the day Kevan Lin MD, who was available if needed.    LAILA COLLAZO

## 2019-03-06 NOTE — PROGRESS NOTES
NAVIGATE Program Case Management/Care Coordination  A Part of the [facility] First Episode of Psychosis Program     Patient Name: Johnny Moraes  /Age:  2000 (18 year old)  Diagnosis(es):   Schizophrenia, unspecified (F20.9)  Clinician: RAINA Blanchard     1. Type of contact:   Treatment Plan/Discharge Meeting    2. People involved:   Client: Yes  Significant Other/Family/Friend: Mother, Father and Stepfather  NAVIGATE Director/Family Clinician: DELMER Berry LGSW NAVIGATE IRT: DELMER Blanchard LGSW NAVIGATE Supported : Anais Quiroz    3. Total number of persons who participated in contact: 7, including NAVIGATE team    4. Length of Actual Contact: 60 minutes, Record Minutes Travel Time: 0 minutes    5. Location of contact:  Psychiatry Clinic, [facility]    6. Techniques utilized:   [place] announced at beginning of session  Review of each team member's role and summarization of progress made  Review of medications  Review of goals and associated strengths, barriers, objectives, and interventions  Illicit client/family feedback    7. Assessment/Progress Note:     Johnny, along with his family, joined the above mentioned individuals for a discharge meeting. Johnny discussed the progress he has made while being enrolled in the NAVIGATE program. He stated he is enrolled in college courses, participating in family events, engaged in fitness, increasingly aware of his emotions/thoughts, and regularly taking medications. Johnny's family also provided feedback regarding their thoughts with the family portion of the program. Johnny stated he has plans to continue injections at this clinic until he obtains ongoing community prescriber services. He requested community general therapy referrals; this writer will provide a list of providers at his next clinic injection visit.     This is a non-billable encounter as three different disciplines were not present.     8. Plan/Referrals:     This writer  will provide additional referrals as needed.    RAINA Blanchard   NAVIGATE Direction & Family Clinician

## 2019-03-20 ENCOUNTER — ALLIED HEALTH/NURSE VISIT (OUTPATIENT)
Dept: PSYCHIATRY | Facility: CLINIC | Age: 19
End: 2019-03-20
Payer: COMMERCIAL

## 2019-03-20 VITALS — DIASTOLIC BLOOD PRESSURE: 70 MMHG | HEART RATE: 72 BPM | SYSTOLIC BLOOD PRESSURE: 123 MMHG

## 2019-03-20 DIAGNOSIS — F20.9 SCHIZOPHRENIA, UNSPECIFIED (H): Primary | ICD-10-CM

## 2019-03-20 NOTE — PROGRESS NOTES
Johnny Moraes comes into clinic today at the request of Heriberto Bray MD Ordering Provider for Med Injection only Risperdal Consta.    Medication checked by: Lynda Lassiter RN  Prior to administration pt identified by name and : yes    Appearance: bright   Mood: good  Affect: normal  Eye contact: good  Side effects: none noted  Level of cooperation: cooperative  Education: move arm to avoid stiffness  Next appt: Pt states this is his last injection as he is done with Navigate appointments. I wished him good luck.       Medication provided by Pharmacy        This service provided today was under the supervising provider of the day Betsy Fried MD, who was available if needed.    Yazan Del Angel

## 2019-03-29 ENCOUNTER — TELEPHONE (OUTPATIENT)
Dept: PSYCHIATRY | Facility: CLINIC | Age: 19
End: 2019-03-29

## 2019-03-29 NOTE — TELEPHONE ENCOUNTER
- Writer returned call to Yeimi.  Received voicemail.  Left message asking for a return call.  Clinic number provided.

## 2019-03-29 NOTE — TELEPHONE ENCOUNTER
----- Message from Radha Maher sent at 3/29/2019  9:02 AM CDT -----  Regarding: munir  Caller: Yeimi    Relationship to Patient: mom    Call Back Number: [phone]    Reason for Call: Mom would like help with the process of transferring the pt to another psychiatrist. She also scheduled him for another injection with us- is this ok?

## 2019-04-03 ENCOUNTER — ALLIED HEALTH/NURSE VISIT (OUTPATIENT)
Dept: PSYCHIATRY | Facility: CLINIC | Age: 19
End: 2019-04-03
Payer: COMMERCIAL

## 2019-04-03 VITALS — DIASTOLIC BLOOD PRESSURE: 70 MMHG | HEART RATE: 61 BPM | SYSTOLIC BLOOD PRESSURE: 134 MMHG

## 2019-04-03 DIAGNOSIS — F20.9 SCHIZOPHRENIA, UNSPECIFIED (H): Primary | ICD-10-CM

## 2019-04-03 NOTE — NURSING NOTE
Johnny Moraes comes into clinic today at the request of Emilio Bray MD Ordering Provider for Med Injection only Risperdal Consta .    Medication checked by: Cathi Tse RNCC  Prior to administration pt identified by name and : yes    Appearance: dressed casually   Mood: good  Affect: congruent   Eye contact: good   Side effects: denies   Level of cooperation: cooperative  Education: none needed  Next appt:  19      Medication provided by Pharmacy      This service provided today was under the supervising provider of the day Miller Balderas MD, who was available if needed.    LAILA COLLAZO

## 2019-04-17 ENCOUNTER — TELEPHONE (OUTPATIENT)
Dept: PSYCHIATRY | Facility: CLINIC | Age: 19
End: 2019-04-17

## 2019-04-17 NOTE — TELEPHONE ENCOUNTER
NAVIGATE Family Peer Support  A Part of the [facility] First Episode of Psychosis Program     Patient Name: Johnny Moraes  /Age:  2000 (19 year old)  Date of Encounter: 19  Length of Contact: 1 minute    Reached out to offer resources and support to patient's mother, Yeimi.     ANAHI CalderonATE Family Peer    [Billing Code 72990 for No Billable Service as family peer support is a nonbillable service]

## 2019-04-18 ENCOUNTER — ALLIED HEALTH/NURSE VISIT (OUTPATIENT)
Dept: PSYCHIATRY | Facility: CLINIC | Age: 19
End: 2019-04-18
Payer: COMMERCIAL

## 2019-04-18 VITALS — SYSTOLIC BLOOD PRESSURE: 134 MMHG | DIASTOLIC BLOOD PRESSURE: 69 MMHG | HEART RATE: 65 BPM

## 2019-04-18 DIAGNOSIS — F20.9 SCHIZOPHRENIA, UNSPECIFIED TYPE (H): Primary | ICD-10-CM

## 2019-04-18 NOTE — NURSING NOTE
Johnny Moraes comes into clinic today at the request of Emilio Bray MD Ordering Provider for Med Injection only Risperdal Consta.    Medication checked by: Guillermo Snowden RNCC  Prior to administration pt identified by name and : yes    Appearance: dressed casually   Mood: good  Affect: flat  Eye contact: good  Side effects: denies   Level of cooperation: cooperative  Education: none needed   Next appt: 2 weeks       Medication provided by Pharmacy      This service provided today was under the supervising provider of the day SG Lambert MD, who was available if needed.    LAILA COLLAZO

## 2019-04-22 DIAGNOSIS — F41.1 GAD (GENERALIZED ANXIETY DISORDER): ICD-10-CM

## 2019-04-22 DIAGNOSIS — F39 MOOD DISORDER (H): ICD-10-CM

## 2019-04-25 RX ORDER — FLUOXETINE 40 MG/1
CAPSULE ORAL
Qty: 30 CAPSULE | Refills: 0 | Status: SHIPPED | OUTPATIENT
Start: 2019-04-25

## 2019-04-25 NOTE — TELEPHONE ENCOUNTER
-Writer was able to get a hold of patient.  He states that he has been taking the Fluoxetine, [facility] had some older orders and they have filled them.  States he has been taking it daily.  Requesting that writer send a script to his new pharmacy.

## 2019-05-02 ENCOUNTER — ALLIED HEALTH/NURSE VISIT (OUTPATIENT)
Dept: PSYCHIATRY | Facility: CLINIC | Age: 19
End: 2019-05-02
Payer: COMMERCIAL

## 2019-05-02 VITALS — HEART RATE: 61 BPM | DIASTOLIC BLOOD PRESSURE: 56 MMHG | SYSTOLIC BLOOD PRESSURE: 129 MMHG

## 2019-05-02 DIAGNOSIS — F20.9 SCHIZOPHRENIA, UNSPECIFIED TYPE (H): Primary | ICD-10-CM

## 2019-05-02 NOTE — NURSING NOTE
Johnny Moraes comes into clinic today at the request of Emilio Bray Md Ordering Provider for Med Injection only Risperdal Consta.    Medication checked by: Cathi Tse RNCC  Prior to administration pt identified by name and : yes    Appearance: dressed casually   Mood: good  Affect: wnl  Eye contact: good  Side effects: denies   Level of cooperation: cooperative  Education: none needed   Next appt: 2 weeks       Medication provided by Pharmacy      This service provided today was under the supervising provider of the day Miller Balderas MD, who was available if needed.    LAILA COLLAZO

## 2019-05-16 ENCOUNTER — ALLIED HEALTH/NURSE VISIT (OUTPATIENT)
Dept: PSYCHIATRY | Facility: CLINIC | Age: 19
End: 2019-05-16
Payer: COMMERCIAL

## 2019-05-16 VITALS — HEART RATE: 51 BPM | DIASTOLIC BLOOD PRESSURE: 75 MMHG | SYSTOLIC BLOOD PRESSURE: 134 MMHG

## 2019-05-16 DIAGNOSIS — F20.9 SCHIZOPHRENIA, UNSPECIFIED TYPE (H): Primary | ICD-10-CM

## 2019-05-16 NOTE — NURSING NOTE
Johnny Moraes comes into clinic today at the request of Emilio Bray MD Ordering Provider for Med Injection only Risperdal Consta.    Medication checked by: Guillermo Snowden RNCC  Prior to administration pt identified by name and : yes    Appearance: dressed casually   Mood: good  Affect: wnl  Eye contact: good  Side effects: denies   Level of cooperation: cooperative  Education: none needed   Next appt: none    Patient reports he will be transferring to [facility], had him fill out release today      Medication provided by Pharmacy      This service provided today was under the supervising provider of the day SG Lambert MD, who was available if needed.    LAILA COLLAZO

## 2019-05-31 ENCOUNTER — ALLIED HEALTH/NURSE VISIT (OUTPATIENT)
Dept: PSYCHIATRY | Facility: CLINIC | Age: 19
End: 2019-05-31
Payer: COMMERCIAL

## 2019-05-31 VITALS — SYSTOLIC BLOOD PRESSURE: 102 MMHG | DIASTOLIC BLOOD PRESSURE: 55 MMHG

## 2019-05-31 DIAGNOSIS — F20.9 SCHIZOPHRENIA, UNSPECIFIED TYPE (H): Primary | ICD-10-CM

## 2019-05-31 NOTE — NURSING NOTE
Johnny Moraes comes into clinic today at the request of Emilio Bray MD Ordering Provider for Med Injection only Risperdal Consta.    Medication checked by: Cathi Tse RNCC  Prior to administration pt identified by name and : yes    Appearance: dressed casually   Mood: good  Affect: wnl  Eye contact: good  Side effects: denies  Level of cooperation: cooperative  Education: none needed   Next appt: 2 weeks       Medication provided by Pharmacy      This service provided today was under the supervising provider of the day Danyell Junior MD, who was available if needed.    LAILA COLLAZO

## 2019-06-16 DIAGNOSIS — F41.1 GAD (GENERALIZED ANXIETY DISORDER): ICD-10-CM

## 2019-06-16 DIAGNOSIS — F39 MOOD DISORDER (H): ICD-10-CM

## 2019-06-17 ENCOUNTER — TELEPHONE (OUTPATIENT)
Dept: PSYCHIATRY | Facility: CLINIC | Age: 19
End: 2019-06-17

## 2019-06-17 NOTE — TELEPHONE ENCOUNTER
What is the concern that needs to be addressed by a nurse? Because pt missed his injection on Friday. Mom wants to know if he should take any medication today. Call [phone]    May a detailed message be left on voicemail? Yes    Date of last office visit: 2/18/19    Message routed to: Nurse

## 2019-06-18 ENCOUNTER — ALLIED HEALTH/NURSE VISIT (OUTPATIENT)
Dept: PSYCHIATRY | Facility: CLINIC | Age: 19
End: 2019-06-18
Payer: COMMERCIAL

## 2019-06-18 VITALS — DIASTOLIC BLOOD PRESSURE: 72 MMHG | SYSTOLIC BLOOD PRESSURE: 133 MMHG | HEART RATE: 59 BPM

## 2019-06-18 DIAGNOSIS — F20.9 SCHIZOPHRENIA, UNSPECIFIED TYPE (H): Primary | ICD-10-CM

## 2019-06-18 NOTE — NURSING NOTE
Johnny Moraes comes into clinic today at the request of Emilio Bray MD Ordering Provider for Med Injection only Riserpdal Consta.    Medication checked by: Cathi Tse RNCC  Prior to administration pt identified by name and : yes    Appearance: dressed casually   Mood: good  Affect: wnl  Eye contact: good  Side effects: denies  Level of cooperation: cooperative  Education: none needed   Next appt: 2 weeks      Medication provided by Pharmacy      This service provided today was under the supervising provider of the day SG Lambert MD, who was available if needed.    LAILA COLLAZO

## 2019-06-18 NOTE — TELEPHONE ENCOUNTER
-Writer followed up with Yeimi.  Let her know that patient needs to be scheduled with another provider outside of [facility] as soon as possible as we are suppose to only be providing interim services until he can be seen elsewhere.  She agreed to this.      -She also informed writer that patient will be taking a trip to [facility] with family in July.  She believes injection would be due in middle of trip.  She is wondering what to do.  Let her know that patient would probably have to start oral medications, however a provider would have to order this.  Discussed that patient would have to see a provider, but she is unsure who he could see.  Will follow up with Anais Navigate director to see which provider would be available.

## 2019-06-21 RX ORDER — FLUOXETINE 40 MG/1
CAPSULE ORAL
Qty: 30 CAPSULE | Refills: 1 | Status: SHIPPED | OUTPATIENT
Start: 2019-06-21

## 2019-06-21 NOTE — TELEPHONE ENCOUNTER
Anais Valera, [facility]  Lynda Lassiter, RN   Caller: Unspecified (4 days ago,  8:52 AM)             Betsy is the only one. He needs to transfer or start returning for appts.

## 2019-07-02 ENCOUNTER — ALLIED HEALTH/NURSE VISIT (OUTPATIENT)
Dept: PSYCHIATRY | Facility: CLINIC | Age: 19
End: 2019-07-02
Payer: COMMERCIAL

## 2019-07-02 DIAGNOSIS — F20.9 SCHIZOPHRENIA, UNSPECIFIED TYPE (H): Primary | ICD-10-CM

## 2019-07-02 NOTE — PROGRESS NOTES
Writer met with patient to let him know that injection services have been transferred to [facility] per [facility].  He will call them later to discuss this.  Patient will no longer be receiving services at [facility].

## 2019-07-08 ENCOUNTER — TELEPHONE (OUTPATIENT)
Dept: PSYCHIATRY | Facility: CLINIC | Age: 19
End: 2019-07-08

## 2019-07-08 NOTE — TELEPHONE ENCOUNTER
On 6/24/2019 the patient signed an KAILASH authorizing medical records to be released from [facility] to [facility] for the purpose of continuing care. I sent the request to medical records via the tube system and kept a copy in psychiatry until scanning is complete.  Michelle Mccarthy [facility]

## 2019-08-02 ENCOUNTER — RECORDS - HEALTHEAST (OUTPATIENT)
Dept: LAB | Facility: CLINIC | Age: 19
End: 2019-08-02

## 2019-08-04 LAB
BACTERIA SPEC CULT: NORMAL
BACTERIA SPEC CULT: NORMAL

## 2019-09-17 DIAGNOSIS — F41.1 GAD (GENERALIZED ANXIETY DISORDER): ICD-10-CM

## 2019-09-17 DIAGNOSIS — F39 MOOD DISORDER (H): ICD-10-CM

## 2019-09-17 RX ORDER — FLUOXETINE 40 MG/1
CAPSULE ORAL
Qty: 30 CAPSULE | Refills: 0 | OUTPATIENT
Start: 2019-09-17

## 2020-03-10 ENCOUNTER — HEALTH MAINTENANCE LETTER (OUTPATIENT)
Age: 20
End: 2020-03-10

## 2020-05-18 NOTE — PROGRESS NOTES
"NAVIGATE SEE Progress Note   For Supported Employment & Education    NAVIGATE Enrollee: Johnny Moraes (2000)     MRN: [id number]  Date:  11/16/17  Clinician: A[employer] Supported Employment & , Anais Quiroz    1. Client Status Update:     1a. Education - Johnny Moraes is interested in education   1A10. Client continuing a class or school program      1b. Employment - Johnny Moraes is interested in employment   1B8. Client had interview with potential employer    2. People present:   SEE/Writer  Client: Johnny Moraes    3. Total number of persons who participated in contact: (do not count yourself/SEE) 1    4. Length of Actual Contact: 40 minutes   Traveled? (if yes, specify total minutes of travel time) Yes 70 min    5. Location of contact:  Client's Home    6. Brief description of session, contact, or client status (include: strategies, interventions, client reaction to contact, next steps, etc)    Johnny and this writer met at Johnny's home. Johnyn was very tired and said he missed a few doses of his risperdal and it had been \"making me feel weird - I don't know how to describe it but I feel like, disconnected and foggy\". This writer asked if he felt safe and if he knew that his prescriptions had been refilled. Johnny said that he did feel safe and wasn't feeling depressed, just tired. Johnny said he did know they were refilled but he picked them up late. Johnny said he wants to get better at requesting refills as he found this sensation to be unpleasant.   Johnny informed this writer that he was offered, and accepted the  position at [employer]. He starts training on Nov 30th and will work 5-7 hours per week. Johnny said he's excited but a little anxious to add another task to his routine. This writer reminded Johnny that he is completing high school next week and won't have to do homework, so to think of it as taking the place of his school work.  This writer informed Johnny that the [facility]" Impression: Primary open-angle glaucoma, bilateral, mild stage: H40.1131. Plan: IOP at target range. Patient will speak to pharmacy to see why Travoprost was given to her and what alternatives can she have other than travoprost similar to Travatan. Continue with Travoprost qhs ou unil she can get Travatan or an cost effective drop. clinic has been looking for quotes that they can rotate though on the TVs and Johnny agreed to use his first name and last initial for school. Johnny provided a quote about graduating.    Johnny's high school online courses continue to go well and Johnny currently has an A- in the class. Johnny is a little behind on his assignments but acknowledged that 'its crunch time'. Johnny was not interested in completing any homework with his SEE but said he would complete two assignments today 'after a nap'. As Johnny was so tired, this writer ended the session early and congratulated him on getting the job. Johnny isn't able to meet next week due to family obligations, so we will continue services on the 30th and move to every other week or monthly check ins.     7. Completion of mutually agreed upon client task from previous meeting:  Completed    8. Orientation and Treatment Planning:  Pursuing current SEE goals    10. Placement:    11. Follow Along Supports: (for clients who are working or attending school)       11a. Education   11A8. Reviewing coping skills for symptoms for school      11b. Employment   11B5. Assisting with development and use of natural support for work      12. Mutually agreed upon client task for next meeting:     Complete remaining online assignments, apply for new DL, complete Wizard set up for work    13. Next Meeting Scheduled for: 2 weeks    Anais MARX Supported Employment &

## 2020-11-10 ENCOUNTER — AMBULATORY - HEALTHEAST (OUTPATIENT)
Dept: LAB | Facility: HOSPITAL | Age: 20
End: 2020-11-10

## 2020-11-10 DIAGNOSIS — Z79.899 LONG TERM USE OF DRUG: ICD-10-CM

## 2020-11-10 LAB
ALBUMIN SERPL-MCNC: 3.9 G/DL (ref 3.5–5)
ALBUMIN SERPL-MCNC: 3.9 G/DL (ref 3.5–5)
ALP SERPL-CCNC: 80 U/L (ref 45–120)
ALP SERPL-CCNC: 80 U/L (ref 45–120)
ALT SERPL W P-5'-P-CCNC: 21 U/L (ref 0–45)
ALT SERPL W P-5'-P-CCNC: 21 U/L (ref 0–45)
ANION GAP SERPL CALCULATED.3IONS-SCNC: 7 MMOL/L (ref 5–18)
AST SERPL W P-5'-P-CCNC: 21 U/L (ref 0–40)
AST SERPL W P-5'-P-CCNC: 21 U/L (ref 0–40)
BASOPHILS # BLD AUTO: 0 THOU/UL (ref 0–0.2)
BASOPHILS NFR BLD AUTO: 0 % (ref 0–2)
BILIRUB DIRECT SERPL-MCNC: 0.1 MG/DL
BILIRUB SERPL-MCNC: 0.3 MG/DL (ref 0–1)
BILIRUB SERPL-MCNC: 0.3 MG/DL (ref 0–1)
BUN SERPL-MCNC: 16 MG/DL (ref 8–22)
CALCIUM SERPL-MCNC: 8.7 MG/DL (ref 8.5–10.5)
CHLORIDE BLD-SCNC: 106 MMOL/L (ref 98–107)
CHOLEST SERPL-MCNC: 128 MG/DL
CO2 SERPL-SCNC: 28 MMOL/L (ref 22–31)
CREAT SERPL-MCNC: 0.81 MG/DL (ref 0.7–1.3)
EOSINOPHIL # BLD AUTO: 0.1 THOU/UL (ref 0–0.4)
EOSINOPHIL NFR BLD AUTO: 2 % (ref 0–6)
ERYTHROCYTE [DISTWIDTH] IN BLOOD BY AUTOMATED COUNT: 12.2 % (ref 11–14.5)
FASTING STATUS PATIENT QL REPORTED: YES
GFR SERPL CREATININE-BSD FRML MDRD: >60 ML/MIN/1.73M2
GLUCOSE BLD-MCNC: 95 MG/DL (ref 70–125)
HCT VFR BLD AUTO: 39.5 % (ref 40–54)
HDLC SERPL-MCNC: 36 MG/DL
HGB BLD-MCNC: 13.6 G/DL (ref 14–18)
IMM GRANULOCYTES # BLD: 0.1 THOU/UL
IMM GRANULOCYTES NFR BLD: 1 %
LDLC SERPL CALC-MCNC: 78 MG/DL
LYMPHOCYTES # BLD AUTO: 2.4 THOU/UL (ref 0.8–4.4)
LYMPHOCYTES NFR BLD AUTO: 34 % (ref 20–40)
MCH RBC QN AUTO: 30.7 PG (ref 27–34)
MCHC RBC AUTO-ENTMCNC: 34.4 G/DL (ref 32–36)
MCV RBC AUTO: 89 FL (ref 80–100)
MONOCYTES # BLD AUTO: 0.7 THOU/UL (ref 0–0.9)
MONOCYTES NFR BLD AUTO: 11 % (ref 2–10)
NEUTROPHILS # BLD AUTO: 3.6 THOU/UL (ref 2–7.7)
NEUTROPHILS NFR BLD AUTO: 52 % (ref 50–70)
PLATELET # BLD AUTO: 317 THOU/UL (ref 140–440)
PMV BLD AUTO: 10.3 FL (ref 8.5–12.5)
POTASSIUM BLD-SCNC: 4.8 MMOL/L (ref 3.5–5)
PROLACTIN SERPL-MCNC: 39.6 NG/ML (ref 0–15)
PROT SERPL-MCNC: 7 G/DL (ref 6–8)
PROT SERPL-MCNC: 7 G/DL (ref 6–8)
RBC # BLD AUTO: 4.43 MILL/UL (ref 4.4–6.2)
SODIUM SERPL-SCNC: 141 MMOL/L (ref 136–145)
TRIGL SERPL-MCNC: 69 MG/DL
WBC: 6.9 THOU/UL (ref 4–11)

## 2020-12-27 ENCOUNTER — HEALTH MAINTENANCE LETTER (OUTPATIENT)
Age: 20
End: 2020-12-27

## 2021-04-24 ENCOUNTER — HEALTH MAINTENANCE LETTER (OUTPATIENT)
Age: 21
End: 2021-04-24

## 2021-06-16 NOTE — MR AVS SNAPSHOT
After Visit Summary   2/16/2018    Johnny Moraes    MRN: [id number]           Patient Information     Date Of Birth          2000        Visit Information        Provider Department      2/16/2018 12:00 PM Anais Quiroz [facility] Psych[facility]        Today's Diagnoses     Mood disorder (H)    -  1       Follow-ups after your visit        Your next 10 appointments already scheduled     Feb 19, 2018 11:00 AM CST   [facility] Psych[facility] with [facility])    [address] [place] [address]   [phone]            Feb 26, 2018 11:00 AM CST   [facility]    [address] [place] [address]   [phone]            Mar 05, 2018  1:00 PM CST   [facility]    [address] [place] [address]   [phone]            Mar 12, 2018 11:00 AM CDT   [facility] Psychotherapy with [facility]    [address] [place] [address]   [phone]            Mar 19, 2018 11:00 AM CDT   [facility] Psychotherapy with Mary [facility])    [address] [place] [address]   [phone]            Mar 26, 2018 11:00 AM CDT   [facility] Psychotherapy with Mary Johnson [facility])    [address]  [facility] [address] [phone]              Who to contact     Please call your clinic at [phone] to:    Ask questions about your health    Make or cancel appointments    Discuss your medicines    Learn about your test results    Speak to your doctor            Additional Information About Your Visit        MyChart Information     MyChart is an electronic  Hearing Aid Evaluation:    Referring Physician: Dr. Delatorre     History: The patient has a history of normal hearing sensitivity for 250-1500 Hz, sloping to mild-severe sensorineural hearing loss for 5118-1791 Hz in the right ear, and normal hearing sensitivity for 250-1000 Hz, sloping to moderate sensorineural hearing loss for 6836-4338 Hz in the left ear.   Medical clearance has been provided from Dr. Delatorre. The patient s hearing concerns include: hearing in background noise, understanding conversations at restaurants and in meetings.     Procedure: Hearing aid styles, technology levels, trial period and realistic expectations are discussed in detail.  The patient has an android phone, but is more interested in rechargeable hearing aids than streaming capabilities. The patient is interested in pursuing -in-the-ear hearing devices, in both ears. He is not ready to order hearing aids at this time, but prefers to call back and indicate which technology level he would like to order should he wish to proceed.  Bilateral Phonak Audeo B rechargeable devices are selected in color P5 coupled to size 1 receivers and generic domes.       Plan: Will wait to order hearing aids until patient calls back with the technology level he will like. Should he order a mid or premium level, he would like an extra  through Phonak. The patient is provided the [facility] Health brochure.   The patient should call this clinic with any questions or concerns. The patient verbalized understanding and is in agreement with this plan.    Markus Young, CCC-A  [place] Licensed Audiologist #[id number]     gateway that provides easy, online access to your medical records. With [employer], you can request a clinic appointment, read your test results, renew a prescription or communicate with your care team.     To sign up for [employer] visit the website at [email or web address]   You will be asked to enter the access code listed below, as well as some personal information. Please follow the directions to create your username and password.     Your access code is: [email or web address]  Expires: 2018  7:55 AM     Your access code will  in 90 days. If you need help or a new code, please contact your [facility] or call [phone] for assistance.      [employer] is an electronic gateway that provides easy, online access to your medical records. With [employer], you can request a clinic appointment, read your test results, renew a prescription or communicate with your care team.     To sign up for [employer], please contact your [facility] or call [phone] for assistance.           Care EveryWhere ID     This is your Care EveryWhere ID. This could be used by other organizations to access your [facility] medical records  [id number]         Blood Pressure from Last 3 Encounters:   18 167/75   18 146/69   17 132/90    Weight from Last 3 Encounters:   18 193 lb 9.6 oz (87.8 kg) (91 %)*   18 188 lb (85.3 kg) (88 %)*   17 187 lb (84.8 kg) (88 %)*     * Growth percentiles are based on CDC 2-20 Years data.              Today, you had the following     No orders found for display       Primary Care Provider Office Phone # Fax #    Jet Salas [phone]       [address] [address]        Equal Access to Services     MIREILLE CALABRESE AH: Juice stover Sorhea, waaxda luqadaha, qaybta kaalmameryl garibay . So [facility]  [phone].    ATENCIÓN: Si neyda deleon, tiene a hernandez disposición servicios gratuitos de asistencia lingüística. Maryan pollard [phone].    We comply with applicable federal civil rights laws and [place] laws. We do not discriminate on the basis of race, color, national origin, age, disability, sex, sexual orientation, or gender identity.            Thank you!     Thank you for choosing [facility] [[email or web address]] PSYCHIATRY  for your care. Our goal is always to provide you with excellent care. Hearing back from our patients is one way we can continue to improve our services. Please take a few minutes to complete the written survey that you may receive in the mail after your visit with us. Thank you!             Your Updated Medication List - Protect others around you: Learn how to safely use, store and throw away your medicines at www.disposemymeds.org.          This list is accurate as of 2/16/18  3:42 PM.  Always use your most recent med list.                   Brand Name Dispense Instructions for use Diagnosis    docusate sodium 100 MG capsule    COLACE    60 capsule    Take 1 capsule (100 mg) by mouth 2 times daily as needed for constipation    Constipation, unspecified constipation type       * FLUoxetine 40 MG capsule    PROzac    30 capsule    Take 40mg by mouth once daily. Take with 20mg by mouth once daily to equal total daily dose of 60mg per day.    BELGICA (generalized anxiety disorder), Mood disorder (H)       * FLUoxetine 20 MG capsule    PROzac    30 capsule    Take 20 mg by mouth once daily. Take with 40mg by mouth once daily to equal total daily dose of 60mg per day.    Mood disorder (H), BELGICA (generalized anxiety disorder)       lamoTRIgine 25 MG tablet    LaMICtal    60 tablet    Take 2 tablets (50 mg) by mouth daily Take one tab daily    Mood disorder (H), Psychosis, unspecified psychosis type       loratadine 10 MG tablet    CLARITIN     Take 10 mg by mouth        metFORMIN 500 MG 24 hr tablet    GLUCOPHAGE-XR    60  tablet    Take 1 tablet (500 mg) by mouth 2 times daily (with meals)    Psychosis, unspecified psychosis type       risperiDONE 2 MG tablet    risperDAL    30 tablet    Take 1 tablet (2 mg) by mouth At Bedtime    Psychosis, unspecified psychosis type       * Notice:  This list has 2 medication(s) that are the same as other medications prescribed for you. Read the directions carefully, and ask your doctor or other care provider to review them with you.

## 2021-08-07 NOTE — TELEPHONE ENCOUNTER
Johnny's father, John, called to speak to this writer. He stated he and Johnny's mother (Yeimi) are very concerned about Johnny's well-being. Johnny has been isolating in his room for most of the day and has been falling asleep on the couch, according to John. Johnny was unable to attend an IRT session this week, due to this writer's family emergency. John stated Johnny has been paranoid about the construction workers in Yeimi's home. John said he plans to take Johnny to his grandparent's house while John has a meeting today. However, John is concerned because Johnny seems anxious about it. John noted Johnny's grandparent's house is a safe place, but Johnny has been anxious about a variety of situations lately. John then asked this writer if an IRT appointment could be scheduled prior to Monday. This writer will meet with Johnny on 4/13/17 at 9:30 a.m. John thanked this writer and had no other concerns at this time.    68

## 2021-09-21 ENCOUNTER — HOSPITAL ENCOUNTER (EMERGENCY)
Facility: CLINIC | Age: 21
Discharge: HOME | End: 2021-09-21
Attending: EMERGENCY MEDICINE | Admitting: EMERGENCY MEDICINE
Payer: COMMERCIAL

## 2021-09-21 VITALS
TEMPERATURE: 98 F | BODY MASS INDEX: 27.13 KG/M2 | WEIGHT: 179 LBS | HEART RATE: 63 BPM | HEIGHT: 68 IN | RESPIRATION RATE: 16 BRPM | OXYGEN SATURATION: 96 % | DIASTOLIC BLOOD PRESSURE: 76 MMHG | SYSTOLIC BLOOD PRESSURE: 131 MMHG

## 2021-09-21 DIAGNOSIS — F43.9 STRESS: ICD-10-CM

## 2021-09-21 DIAGNOSIS — F41.9 ANXIETY: ICD-10-CM

## 2021-09-21 PROCEDURE — 99282 EMERGENCY DEPT VISIT SF MDM: CPT | Performed by: EMERGENCY MEDICINE

## 2021-09-21 PROCEDURE — 99283 EMERGENCY DEPT VISIT LOW MDM: CPT | Performed by: EMERGENCY MEDICINE

## 2021-09-21 ASSESSMENT — MIFFLIN-ST. JEOR: SCORE: 1786.44

## 2021-09-21 NOTE — ED PROVIDER NOTES
History     Chief Complaint   Patient presents with     Medication Refill     HPI  Johnny Moraes is a 21 year old male with PMH notable for anxiety, psychosis NOS who presents to the ED with stress and anxiety. Patient comes with a syringe of paliperidone and requests that it be given to him as an injection. Patient reports that he previously had been on the medication, had titrated off of it in the spring and close consultation with the NP that manages his psychiatric medications. He currently takes only Prozac and trazodone. Patient notes feeling increased stress and anxiety recently. He is worried that he may progress to psychosis as he did 4-5 years ago. He has had increased difficulty with sleep. He has had stressors dealing with his grandfather passing away and family issues with the estate. No suicidal ideation, no hallucinations.    Past Medical History  Past Medical History:   Diagnosis Date     Depressive disorder      Thyroid disease      Past Surgical History:   Procedure Laterality Date     APPENDECTOMY       LAPAROSCOPIC APPENDECTOMY N/A 10/1/2015    Procedure: LAPAROSCOPIC APPENDECTOMY ;  Surgeon: Satnam Sharp MD;  Location: [facility];  Service:      FLUoxetine (PROZAC) 40 MG capsule  FLUoxetine (PROZAC) 40 MG capsule  hydrOXYzine (VISTARIL) 25 MG capsule  loratadine (CLARITIN) 10 MG tablet  multivitamin, therapeutic with minerals (MULTI-VITAMIN) TABS tablet  risperiDONE (RISPERDAL) 2 MG tablet  risperiDONE microspheres (RISPERDAL CONSTA) 25 MG injection      Allergies   Allergen Reactions     Seasonal Allergies      Social History   Social History     Tobacco Use     Smoking status: Former Smoker     Types: Cigarettes     Smokeless tobacco: Never Used     Tobacco comment: Moderate to mild, last use more than a month ago   Substance Use Topics     Alcohol use: No     Drug use: No      Past medical history and social history were reviewed with the patient. Additional pertinent items:  "now taking only fluoxetine and trazodone      Review of Systems  A complete review of systems was performed with pertinent positives and negatives noted in the HPI, and all other systems negative.     Physical Exam   BP: 131/76  Pulse: 63  Temp: 98  F (36.7  C)  Resp: 16  Height: 172.7 cm (5' 8\")  Weight: 81.2 kg (179 lb)  SpO2: 96 %    Physical Exam  General: No acute distress. Appears stated age.   HENT: MMM, no oropharyngeal lesions  Eyes: PERRL, normal sclerae   Cardio: Regular rate, extremities well perfused  Resp: Normal work of breathing, normal respiratory rate  Neuro: alert and fully oriented. CN II-XII grossly intact. Grossly normal strength and sensation in all extremities.   MSK: no deformities.   Integumentary/Skin: no rash visualized, normal color  Psych: normal to mildly anxious affect, normal behavior. no SI. no HI. no hallucinations. Thought process linear. Insight good. Judgement fair to good.     ED Course      Procedures          Labs Ordered and Resulted from Time of ED Arrival Up to the Time of Departure from the ED - No data to display  No orders to display          Assessments & Plan (with Medical Decision Making)   Patient presenting with increased rest anxiety recently, request to administer a paliperidone injection. Vitals in the ED unremarkable. Nursing notes reviewed.     Patient is without any findings of acute psychosis, is somewhat stressed and more anxious recently due to stressors in life. He is well engaged in his own health and future goal oriented. He is well connected with therapy and an NP that has been managing his psychiatric medications for several years. He has an appointment for 6 days from now. He stated that he would be able to get in contact with the provider in the morning for guidance on any potential medication adjustments. After discussions, he was in agreement to deferring the injection of paliperidone, which is a very long-acting medication.     After counseling " on the diagnosis, work-up, and treatment plan, the patient was discharged to home. The patient was advised to follow-up with his psychiatric medication provider in the morning by phone. The patient was advised to return to the ED if worsening symptoms, or if there are any urgent/life-threatening concerns.     Final diagnoses:   Stress   Anxiety     Discharge Medication List as of 9/21/2021  5:18 AM          --  Vitaliy Zepeda MD   Emergency Medicine   [facility] EMERGENCY DEPARTMENT  9/21/2021     Vitaliy Zepeda MD  09/21/21 0611

## 2021-09-21 NOTE — ED TRIAGE NOTES
Pt presents to the ER with c/o on coming symptoms of psychosis. Pt came in to get a shot of his own medication. Pt needs help administering it.

## 2021-09-21 NOTE — DISCHARGE INSTRUCTIONS
Please call the provider managing your psychiatric medications this coming day for advice on potential changes to your medical management.     Return to the ED if you are having worsening symptoms of disorganized thinking, concerns of developing psychosis, thoughts/feelings of harming yourself or others, or any urgent/life-threatening concerns.     DISCHARGE RESOURCES:  -[facility] [phone]   -[facility] [phone] or [phone].  -Crisis Intervention: [phone] or [phone] (TTY: [phone]).  Call anytime.  -Suicide Awareness Voices of Education (SAVE) (www.save.org): 675-040-JDUZ (5989)  -National Suicide Prevention Line (www.mentalhealthmn.org): 029-251-ASHV (0663)  -National [place] [employer] Mental Illness (www.[place].freedom.org): 138.633.6184 or 718-215-7427.  -Rvhv2mbfz: text the word LIFE to 21629 for immediate support and crisis intervention  -Mental Health Consumer/Survivor Network of [place] (www.mhcsn.net): 453.892.4066 or 941-335-7433  -Mental [employer] [place] (www.mentalhealth.org): 339.117.9987 or 701-723-6082

## 2021-10-09 ENCOUNTER — HEALTH MAINTENANCE LETTER (OUTPATIENT)
Age: 21
End: 2021-10-09

## 2022-05-04 ENCOUNTER — LAB REQUISITION (OUTPATIENT)
Dept: LAB | Facility: CLINIC | Age: 22
End: 2022-05-04

## 2022-05-04 DIAGNOSIS — F31.74 BIPOLAR DISORDER, IN FULL REMISSION, MOST RECENT EPISODE MANIC (H): ICD-10-CM

## 2022-05-04 DIAGNOSIS — Z13.220 ENCOUNTER FOR SCREENING FOR LIPOID DISORDERS: ICD-10-CM

## 2022-05-04 LAB
ALBUMIN SERPL-MCNC: 4.2 G/DL (ref 3.5–5)
ALP SERPL-CCNC: 78 U/L (ref 45–120)
ALT SERPL W P-5'-P-CCNC: 13 U/L (ref 0–45)
ANION GAP SERPL CALCULATED.3IONS-SCNC: 9 MMOL/L (ref 5–18)
AST SERPL W P-5'-P-CCNC: 20 U/L (ref 0–40)
BILIRUB SERPL-MCNC: 0.4 MG/DL (ref 0–1)
BUN SERPL-MCNC: 14 MG/DL (ref 8–22)
CALCIUM SERPL-MCNC: 9.8 MG/DL (ref 8.5–10.5)
CHLORIDE BLD-SCNC: 106 MMOL/L (ref 98–107)
CHOLEST SERPL-MCNC: 144 MG/DL
CO2 SERPL-SCNC: 27 MMOL/L (ref 22–31)
CREAT SERPL-MCNC: 0.77 MG/DL (ref 0.7–1.3)
FASTING STATUS PATIENT QL REPORTED: NORMAL
GFR SERPL CREATININE-BSD FRML MDRD: >90 ML/MIN/1.73M2
GLUCOSE BLD-MCNC: 98 MG/DL (ref 70–125)
HDLC SERPL-MCNC: 50 MG/DL
LDLC SERPL CALC-MCNC: 84 MG/DL
POTASSIUM BLD-SCNC: 4.6 MMOL/L (ref 3.5–5)
PROT SERPL-MCNC: 7.2 G/DL (ref 6–8)
SODIUM SERPL-SCNC: 142 MMOL/L (ref 136–145)
TRIGL SERPL-MCNC: 50 MG/DL

## 2022-05-04 PROCEDURE — 80053 COMPREHEN METABOLIC PANEL: CPT | Performed by: FAMILY MEDICINE

## 2022-05-04 PROCEDURE — 82040 ASSAY OF SERUM ALBUMIN: CPT | Performed by: FAMILY MEDICINE

## 2022-05-04 PROCEDURE — 80061 LIPID PANEL: CPT | Performed by: FAMILY MEDICINE

## 2022-05-16 ENCOUNTER — HEALTH MAINTENANCE LETTER (OUTPATIENT)
Age: 22
End: 2022-05-16

## 2022-09-11 ENCOUNTER — HEALTH MAINTENANCE LETTER (OUTPATIENT)
Age: 22
End: 2022-09-11

## 2023-01-24 ENCOUNTER — LAB REQUISITION (OUTPATIENT)
Dept: LAB | Facility: CLINIC | Age: 23
End: 2023-01-24

## 2023-01-24 DIAGNOSIS — Z79.899 OTHER LONG TERM (CURRENT) DRUG THERAPY: ICD-10-CM

## 2023-01-24 PROCEDURE — 84146 ASSAY OF PROLACTIN: CPT | Performed by: FAMILY MEDICINE

## 2023-01-25 LAB — PROLACTIN SERPL 3RD IS-MCNC: 50 NG/ML (ref 4–15)

## 2023-04-06 ENCOUNTER — LAB REQUISITION (OUTPATIENT)
Dept: LAB | Facility: CLINIC | Age: 23
End: 2023-04-06

## 2023-04-06 DIAGNOSIS — F25.0 SCHIZOAFFECTIVE DISORDER, BIPOLAR TYPE (H): ICD-10-CM

## 2023-04-06 DIAGNOSIS — Z51.81 ENCOUNTER FOR THERAPEUTIC DRUG LEVEL MONITORING: ICD-10-CM

## 2023-04-06 PROCEDURE — 84146 ASSAY OF PROLACTIN: CPT | Performed by: FAMILY MEDICINE

## 2023-04-06 PROCEDURE — 80061 LIPID PANEL: CPT | Performed by: FAMILY MEDICINE

## 2023-04-06 PROCEDURE — 80053 COMPREHEN METABOLIC PANEL: CPT | Performed by: FAMILY MEDICINE

## 2023-04-07 LAB
ALBUMIN SERPL BCG-MCNC: 4.7 G/DL (ref 3.5–5.2)
ALP SERPL-CCNC: 72 U/L (ref 40–129)
ALT SERPL W P-5'-P-CCNC: 12 U/L (ref 10–50)
ANION GAP SERPL CALCULATED.3IONS-SCNC: 11 MMOL/L (ref 7–15)
AST SERPL W P-5'-P-CCNC: 22 U/L (ref 10–50)
BILIRUB SERPL-MCNC: 0.2 MG/DL
BUN SERPL-MCNC: 14.6 MG/DL (ref 6–20)
CALCIUM SERPL-MCNC: 9.7 MG/DL (ref 8.6–10)
CHLORIDE SERPL-SCNC: 102 MMOL/L (ref 98–107)
CHOLEST SERPL-MCNC: 146 MG/DL
CREAT SERPL-MCNC: 1.13 MG/DL (ref 0.67–1.17)
DEPRECATED HCO3 PLAS-SCNC: 27 MMOL/L (ref 22–29)
GFR SERPL CREATININE-BSD FRML MDRD: >90 ML/MIN/1.73M2
GLUCOSE SERPL-MCNC: 105 MG/DL (ref 70–99)
HDLC SERPL-MCNC: 49 MG/DL
LDLC SERPL CALC-MCNC: 86 MG/DL
NONHDLC SERPL-MCNC: 97 MG/DL
POTASSIUM SERPL-SCNC: 4 MMOL/L (ref 3.4–5.3)
PROLACTIN SERPL 3RD IS-MCNC: 48 NG/ML (ref 4–15)
PROT SERPL-MCNC: 7.2 G/DL (ref 6.4–8.3)
SODIUM SERPL-SCNC: 140 MMOL/L (ref 136–145)
TRIGL SERPL-MCNC: 55 MG/DL

## 2023-04-20 NOTE — NURSING NOTE
Johnny Moraes comes into clinic today at the request of ELMO Ram PMHNP-BC Ordering Provider for Med Injection only Risperdal Consta.    Medication double checked by: Jet Martell, RNCC  Prior to administration pt identified by name and : yes     Appearance: Dressed Casually    Mood: good  Affect: flat   Eye contact: good   Side effects: denies   Level of cooperation: cooperative   Education: none needed   Next appt: 2 weeks         This service provided today was under the supervising provider of the day ELMO Ram, RIZWAN, who was available if needed.    LAILA COLLAZO   Attending Only

## 2023-06-03 ENCOUNTER — HEALTH MAINTENANCE LETTER (OUTPATIENT)
Age: 23
End: 2023-06-03

## 2023-07-12 ENCOUNTER — LAB REQUISITION (OUTPATIENT)
Dept: LAB | Facility: CLINIC | Age: 23
End: 2023-07-12

## 2023-07-12 DIAGNOSIS — Z51.81 ENCOUNTER FOR THERAPEUTIC DRUG LEVEL MONITORING: ICD-10-CM

## 2023-07-12 LAB — PROLACTIN SERPL 3RD IS-MCNC: 55 NG/ML (ref 4–15)

## 2023-07-12 PROCEDURE — 84403 ASSAY OF TOTAL TESTOSTERONE: CPT | Performed by: FAMILY MEDICINE

## 2023-07-12 PROCEDURE — 84146 ASSAY OF PROLACTIN: CPT | Performed by: FAMILY MEDICINE

## 2023-07-12 PROCEDURE — 84270 ASSAY OF SEX HORMONE GLOBUL: CPT | Performed by: FAMILY MEDICINE

## 2023-07-13 LAB — SHBG SERPL-SCNC: 20 NMOL/L (ref 11–80)

## 2023-07-14 LAB
TESTOST FREE SERPL-MCNC: 10.65 NG/DL
TESTOST SERPL-MCNC: 405 NG/DL (ref 240–950)

## 2024-02-05 ENCOUNTER — LAB REQUISITION (OUTPATIENT)
Dept: LAB | Facility: CLINIC | Age: 24
End: 2024-02-05

## 2024-02-05 DIAGNOSIS — F25.0 SCHIZOAFFECTIVE DISORDER, BIPOLAR TYPE (H): ICD-10-CM

## 2024-02-05 PROCEDURE — 80061 LIPID PANEL: CPT | Performed by: FAMILY MEDICINE

## 2024-02-05 PROCEDURE — 84146 ASSAY OF PROLACTIN: CPT | Performed by: FAMILY MEDICINE

## 2024-02-06 LAB
CHOLEST SERPL-MCNC: 143 MG/DL
FASTING STATUS PATIENT QL REPORTED: NORMAL
HDLC SERPL-MCNC: 45 MG/DL
LDLC SERPL CALC-MCNC: 84 MG/DL
NONHDLC SERPL-MCNC: 98 MG/DL
PROLACTIN SERPL 3RD IS-MCNC: 22 NG/ML (ref 4–15)
TRIGL SERPL-MCNC: 72 MG/DL

## 2024-07-13 ENCOUNTER — HEALTH MAINTENANCE LETTER (OUTPATIENT)
Age: 24
End: 2024-07-13

## 2024-08-21 ENCOUNTER — LAB REQUISITION (OUTPATIENT)
Dept: LAB | Facility: CLINIC | Age: 24
End: 2024-08-21

## 2024-08-21 DIAGNOSIS — E22.1 HYPERPROLACTINEMIA (H): ICD-10-CM

## 2024-08-21 PROCEDURE — 84146 ASSAY OF PROLACTIN: CPT | Performed by: FAMILY MEDICINE

## 2024-08-22 LAB — PROLACTIN SERPL 3RD IS-MCNC: 5 NG/ML (ref 4–15)
